# Patient Record
Sex: FEMALE | Race: WHITE | URBAN - METROPOLITAN AREA
[De-identification: names, ages, dates, MRNs, and addresses within clinical notes are randomized per-mention and may not be internally consistent; named-entity substitution may affect disease eponyms.]

---

## 2017-06-29 ENCOUNTER — OFFICE VISIT (OUTPATIENT)
Dept: FAMILY MEDICINE | Facility: CLINIC | Age: 22
End: 2017-06-29
Payer: COMMERCIAL

## 2017-06-29 VITALS
HEIGHT: 68 IN | TEMPERATURE: 98.6 F | OXYGEN SATURATION: 97 % | WEIGHT: 158.8 LBS | BODY MASS INDEX: 24.07 KG/M2 | SYSTOLIC BLOOD PRESSURE: 110 MMHG | DIASTOLIC BLOOD PRESSURE: 79 MMHG | HEART RATE: 98 BPM

## 2017-06-29 DIAGNOSIS — J02.9 SORE THROAT: ICD-10-CM

## 2017-06-29 DIAGNOSIS — J02.9 VIRAL PHARYNGITIS: Primary | ICD-10-CM

## 2017-06-29 LAB
DEPRECATED S PYO AG THROAT QL EIA: NORMAL
MICRO REPORT STATUS: NORMAL
SPECIMEN SOURCE: NORMAL

## 2017-06-29 PROCEDURE — 87880 STREP A ASSAY W/OPTIC: CPT | Performed by: FAMILY MEDICINE

## 2017-06-29 PROCEDURE — 99213 OFFICE O/P EST LOW 20 MIN: CPT | Performed by: FAMILY MEDICINE

## 2017-06-29 PROCEDURE — 87081 CULTURE SCREEN ONLY: CPT | Performed by: FAMILY MEDICINE

## 2017-06-29 NOTE — MR AVS SNAPSHOT
After Visit Summary   6/29/2017    Ace Peterson    MRN: [id number]           Patient Information     Date Of Birth          1995        Visit Information        Provider Department      6/29/2017 10:30 AM Duane White MD [facility]        Today's Diagnoses     Sore throat    -  1      Care Instructions      Mononucleosis  Mononucleosis (also called mono) is a contagious viral infection. Most infants and children exposed to the virus get only mild flu-like symptoms or no symptoms at all. However, infection is usually more serious in teens and young adults. While the virus is active it causes symptoms and can spread to others. After symptoms subside, the virus stays in the body and eventually becomes inactive. Once you have one case of mono, you are unlikely to develop symptoms again.  The virus is usually spread by contact with saliva, often by kissing. It may also spread by breast milk, blood, or sexual contact. It takes about 4 to 6 weeks to develop symptoms after exposure.  Early symptoms include headache, nausea, tiredness and general muscle aching. This is followed by sore throat and fever. Lymph glands in the neck, under the arms, or in the groin may be swollen. Symptoms usually go away in about 1 to 2 months. But they can last up to four months.  If symptoms have been present less than 1 week or more than 3 weeks, the blood test used to diagnose this disease may be negative even though you have the illness.  In this case, other tests may be done.  Note: Taking the antibiotics ampicillin or amoxicillin during a mono infection may cause a skin rash. This is not serious and will fade in about one week. The cause is a reaction of the drug with the virus.  Note: Mono can cause your spleen to swell. The spleen is a fist-sized organ in the upper left abdomen that stores red blood cells. Injury to a swollen spleen can cause the spleen to rupture. This can cause  life-threatening internal bleeding. To avoid this, do not play contact sports or perform strenuous activity for 8 weeks, or until cleared by your healthcare provider. A sharp blow could rupture a swollen spleen  Home care    Rest in bed until the fever and weakness have gone away.    Drink plenty of fluids, but avoid alcohol. Otherwise, you may eat a regular diet.    Ask your healthcare provider about using over-the-counter medicines to treat symptoms such as fever, pain, or an itchy rash.    Over-the-counter throat lozenges may help soothe a sore throat. Gargling with warm salt water (1/2 teaspoon in 1 glass of warm water) may also be soothing to the throat.    You may return to work or school after the fever goes away and you are feeling better. Continue to follow any activity restrictions you have been given.  Preventing spread of the virus  To limit the spread of the virus, avoid exposing others to your saliva for at least 6 months after your illness (no kissing or sharing utensils, drinking glasses, or toothbrushes).  Follow-up care  Follow up with your healthcare provider within 1 to 2 weeks or as advised by our staff to be sure that there are no complications. If symptoms of extreme fatigue and swollen glands last longer than 6 months, see your healthcare provider for further testing.  When to seek medical advice  Call your healthcare provider if any of the following occur:    Excessive coughing    Yellow skin or eyes     Trouble swallowing  Call 911  Get emergency medical care if any of the following occur:    Severe or worsening abdominal pain    Trouble breathing  Date Last Reviewed: 9/25/2015 2000-2017 [employer]. [address] [place] [address], [place] [address]. All rights reserved. This information is not intended as a substitute for professional medical care. Always follow your healthcare professional's instructions.                Follow-ups after your visit        Who to contact     If you  "have questions or need follow up information about today's clinic visit or your schedule please contact [facility] directly at [phone].  Normal or non-critical lab and imaging results will be communicated to you by MyChart, letter or phone within 4 business days after the clinic has received the results. If you do not hear from us within 7 days, please contact the clinic through Phys[employer]ome[employer] or phone. If you have a critical or abnormal lab result, we will notify you by phone as soon as possible.  Submit refill requests through [employer] or call your pharmacy and they will forward the refill request to us. Please allow 3 business days for your refill to be completed.          Additional Information About Your Visit        PhysihomeharDialMyApp Information     Strap lets you send messages to your doctor, view your test results, renew your prescriptions, schedule appointments and more. To sign up, go to [email or web address] . Click on \"Log in\" on the left side of the screen, which will take you to the Welcome page. Then click on \"Sign up Now\" on the right side of the page.     You will be asked to enter the access code listed below, as well as some personal information. Please follow the directions to create your username and password.     Your access code is: [email or web address]  Expires: 2017 10:56 AM     Your access code will  in 90 days. If you need help or a new code, please call your [place] clinic or [phone].        Care EveryWhere ID     This is your Care EveryWhere ID. This could be used by other organizations to access your [place] medical records  [id number]        Your Vitals Were     Pulse Temperature Height Last Period Pulse Oximetry Breastfeeding?    98 98.6  F (37  C) 5' 7.5\" (1.715 m) 2017 97% No    BMI (Body Mass Index)                   24.5 kg/m2            Blood Pressure from Last 3 Encounters:   17 110/79   16 110/60   05/21/15 118/62    Weight from Last 3 " Encounters:   06/29/17 158 lb 12.8 oz (72 kg)   09/12/16 162 lb 12.8 oz (73.8 kg)   05/21/15 150 lb 8 oz (68.3 kg) (82 %)*     * Growth percentiles are based on [facility] 2-20 Years data.              We Performed the Following     Beta strep group A culture     Strep, Rapid Screen        Primary Care Provider    None Specified       No primary provider on file.        Equal Access to Services     FLAKO REBOLLAR : Hadii dov ku madayo Soyaseminali, waaxda luqadaha, qaybta kaalmada adeegyada, luis carlos norris . So [facility] [phone].    ATENCIÓN: Si habla espmichelle, tiene a diaz disposición servicios gratuitos de asistencia lingüística. Llame al [phone].    We comply with applicable federal civil rights laws and [place] laws. We do not discriminate on the basis of race, color, national origin, age, disability sex, sexual orientation or gender identity.            Thank you!     Thank you for choosing [facility]  for your care. Our goal is always to provide you with excellent care. Hearing back from our patients is one way we can continue to improve our services. Please take a few minutes to complete the written survey that you may receive in the mail after your visit with us. Thank you!             Your Updated Medication List - Protect others around you: Learn how to safely use, store and throw away your medicines at www.disposemymeds.org.      Notice  As of 6/29/2017 10:56 AM    You have not been prescribed any medications.

## 2017-06-29 NOTE — NURSING NOTE
"Chief Complaint   Patient presents with     Pharyngitis       Initial /79 (BP Location: Right arm, Patient Position: Chair, Cuff Size: Adult Small)  Pulse 98  Temp 98.6  F (37  C)  Ht 5' 7.5\" (1.715 m)  Wt 158 lb 12.8 oz (72 kg)  LMP 06/17/2017  SpO2 97%  Breastfeeding? No  BMI 24.5 kg/m2 Estimated body mass index is 24.5 kg/(m^2) as calculated from the following:    Height as of this encounter: 5' 7.5\" (1.715 m).    Weight as of this encounter: 158 lb 12.8 oz (72 kg).  Medication Reconciliation: complete     Ka    "

## 2017-06-30 LAB
BACTERIA SPEC CULT: NORMAL
MICRO REPORT STATUS: NORMAL
SPECIMEN SOURCE: NORMAL

## 2017-07-09 NOTE — PROGRESS NOTES
"  SUBJECTIVE:                                                    Ace Peterson is a 22 year old female who presents to clinic today for the following health issues:    Patient presents with:  Pharyngitis    Patient here for sore throat for the past few days.  Has had a fever intermittently during this time. Denies any contact with strep or mono. Denies cough. No ear pain.    ROS:  CONSTITUTIONAL: Intermittent fever  ENT/MOUTH:  As noted above    OBJECTIVE:                                                    /79 (BP Location: Right arm, Patient Position: Chair, Cuff Size: Adult Small)  Pulse 98  Temp 98.6  F (37  C)  Ht 5' 7.5\" (1.715 m)  Wt 158 lb 12.8 oz (72 kg)  LMP 06/17/2017  SpO2 97%  Breastfeeding? No  BMI 24.5 kg/m2Body mass index is 24.5 kg/(m^2).  GENERAL APPEARANCE: healthy, alert and no distress  EYES: Eyes grossly normal to inspection and conjunctivae and sclerae normal  HENT: ear canals and TM's normal and bilateral tonsils  2+ enlarged with mild erythema, no exudate  NECK: no adenopathy  RESP: lungs clear to auscultation - no rales, rhonchi or wheezes  ABD: no hepatosplenomegaly    Results for orders placed or performed in visit on 06/29/17   Strep, Rapid Screen   Result Value Ref Range    Specimen Description Throat     Rapid Strep A Screen       NEGATIVE: No Group A streptococcal antigen detected by immunoassay, await   culture report.      Micro Report Status FINAL 06/29/2017    Beta strep group A culture   Result Value Ref Range    Specimen Description Throat     Culture Micro No beta hemolytic Streptococcus Group A isolated     Micro Report Status FINAL 06/30/2017         ASSESSMENT/PLAN:                                                    1. Viral pharyngitis   Discussed likely viral pharyngitis. Consider follow-up testing for mononucleosis if not improving over the next few days as it is early in course today and high false negative rate and will return to clinic if this is the " case. Discuss conservative management.    2. Sore throat  - Strep, Rapid Screen  - Beta strep group A culture    Duane White MD  [facility]

## 2017-09-05 ENCOUNTER — ALLIED HEALTH/NURSE VISIT (OUTPATIENT)
Dept: NURSING | Facility: CLINIC | Age: 22
End: 2017-09-05
Payer: COMMERCIAL

## 2017-09-05 DIAGNOSIS — Z23 NEED FOR PROPHYLACTIC VACCINATION AND INOCULATION AGAINST INFLUENZA: Primary | ICD-10-CM

## 2017-09-05 DIAGNOSIS — Z11.1 SCREENING EXAMINATION FOR PULMONARY TUBERCULOSIS: ICD-10-CM

## 2017-09-05 PROCEDURE — 99207 ZZC NO CHARGE NURSE ONLY: CPT

## 2017-09-05 PROCEDURE — 90471 IMMUNIZATION ADMIN: CPT

## 2017-09-05 PROCEDURE — 90686 IIV4 VACC NO PRSV 0.5 ML IM: CPT

## 2017-09-05 PROCEDURE — 86580 TB INTRADERMAL TEST: CPT

## 2017-09-05 NOTE — PROGRESS NOTES
Injectable Influenza Immunization Documentation    1.  Is the person to be vaccinated sick today?  No    2. Does the person to be vaccinated have an allergy to eggs or to a component of the vaccine?  No    3. Has the person to be vaccinated today ever had a serious reaction to influenza vaccine in the past?  No    4. Has the person to be vaccinated ever had Guillain-[place] syndrome?  No     Form completed by Jarod Rodriguez MA

## 2017-09-05 NOTE — NURSING NOTE
The patient is asked the following questions today and these are her answers:    -Have you had a mantoux administered in the past 30 days?    No  -Have you had a previous positive Mantoux.  No  -Have you received BCG in the past.  No  -Have you had a live vaccine  (MMR, Varicella, OPV, Yellow Fever) in the last 6 weeks.  No  -Have you had and active  viral or bacterial infection in the past 6 weeks.  No  -Have you received corticosteroids or immunosuppressive agents in the past 6 weeks.  No  -Have you been diagnosed with HIV?  No  -Do you have a maglinancy?  No   Jarod Rodriguez MA

## 2017-09-05 NOTE — MR AVS SNAPSHOT
"              After Visit Summary   9/5/2017    Ace Peterson    MRN: [id number]           Patient Information     Date Of Birth          1995        Visit Information        Provider Department      9/5/2017 2:00 PM CR SILVER MA/LPN [facility]        Today's Diagnoses     Need for prophylactic vaccination and inoculation against influenza    -  1    Screening examination for pulmonary tuberculosis           Follow-ups after your visit        Your next 10 appointments already scheduled     Sep 07, 2017  3:00 PM CDT   Nurse Only with CR RN   [facility] ([facility])    [address] [facility] [address] [phone]              Who to contact     If you have questions or need follow up information about today's clinic visit or your schedule please contact [facility] directly at [phone].  Normal or non-critical lab and imaging results will be communicated to you by [employer] Everytimehart, letter or phone within 4 business days after the clinic has received the results. If you do not hear from us within 7 days, please contact the clinic through MyChart or phone. If you have a critical or abnormal lab result, we will notify you by phone as soon as possible.  Submit refill requests through [employer] or call your pharmacy and they will forward the refill request to us. Please allow 3 business days for your refill to be completed.          Additional Information About Your Visit        MyChart Information     [employer] lets you send messages to your doctor, view your test results, renew your prescriptions, schedule appointments and more. To sign up, go to [email or web address] . Click on \"Log in\" on the left side of the screen, which will take you to the Welcome page. Then click on \"Sign up Now\" on the right side of the page.     You will be asked to enter the access code listed below, as well as some personal information. Please " follow the directions to create your username and password.     Your access code is: [email or web address]  Expires: 2017 10:56 AM     Your access code will  in 90 days. If you need help or a new code, please call your [place] clinic or [phone].        Care EveryWhere ID     This is your Care EveryWhere ID. This could be used by other organizations to access your [place] medical records  [id number]         Blood Pressure from Last 3 Encounters:   17 110/79   16 110/60   05/21/15 118/62    Weight from Last 3 Encounters:   17 158 lb 12.8 oz (72 kg)   16 162 lb 12.8 oz (73.8 kg)   05/21/15 150 lb 8 oz (68.3 kg) (82 %)*     * Growth percentiles are based on [facility] 2-20 Years data.              We Performed the Following     FLU VAC, SPLIT VIRUS IM > 3 YO (QUADRIVALENT) [54840]     TB INTRADERMAL TEST     Vaccine Administration, Initial [07420]        Primary Care Provider    None Specified       No primary provider on file.        Equal Access to Services     VERN REBOLLAR : Hadii dov Oleary, waniyahda sabina, qaybta kaalmabri estevez, luis carlos norris . So [facility] [phone].    ATENCIÓN: Si habla español, tiene a diaz disposición servicios gratuitos de asistencia lingüística. Llame al [phone].    We comply with applicable federal civil rights laws and [place] laws. We do not discriminate on the basis of race, color, national origin, age, disability sex, sexual orientation or gender identity.            Thank you!     Thank you for choosing [facility]  for your care. Our goal is always to provide you with excellent care. Hearing back from our patients is one way we can continue to improve our services. Please take a few minutes to complete the written survey that you may receive in the mail after your visit with us. Thank you!             Your Updated Medication List - Protect others around you: Learn how to safely use, store and  throw away your medicines at www.disposemymeds.org.      Notice  As of 9/5/2017  2:21 PM    You have not been prescribed any medications.

## 2017-09-07 ENCOUNTER — ALLIED HEALTH/NURSE VISIT (OUTPATIENT)
Dept: NURSING | Facility: CLINIC | Age: 22
End: 2017-09-07
Payer: COMMERCIAL

## 2017-09-07 DIAGNOSIS — Z11.1 SCREENING EXAMINATION FOR PULMONARY TUBERCULOSIS: Primary | ICD-10-CM

## 2017-09-07 LAB
PPDINDURATION: 0 MM (ref 0–5)
PPDREDNESS: NORMAL MM

## 2017-09-07 PROCEDURE — 99207 ZZC NO CHARGE NURSE ONLY: CPT

## 2017-09-07 NOTE — MR AVS SNAPSHOT
"              After Visit Summary   9/7/2017    Ace Peterson    MRN: [id number]           Patient Information     Date Of Birth          1995        Visit Information        Provider Department      9/7/2017 3:00 PM CR RN [facility]        Today's Diagnoses     Screening examination for pulmonary tuberculosis    -  1       Follow-ups after your visit        Your next 10 appointments already scheduled     Sep 07, 2017  3:00 PM CDT   [facility]    [address] [place] [id number] [phone]              Who to contact     If you have questions or need follow up information about today's clinic visit or your schedule please contact [facility] directly at [phone].  Normal or non-critical lab and imaging results will be communicated to you by MyChart, letter or phone within 4 business days after the clinic has received the results. If you do not hear from us within 7 days, please contact the clinic through MyChart or phone. If you have a critical or abnormal lab result, we will notify you by phone as soon as possible.  Submit refill requests through [employer] or call your pharmacy and they will forward the refill request to us. Please allow 3 business days for your refill to be completed.          Additional Information About Your Visit        MyChart Information     [employer] lets you send messages to your doctor, view your test results, renew your prescriptions, schedule appointments and more. To sign up, go to [email or web address] . Click on \"Log in\" on the left side of the screen, which will take you to the Welcome page. Then click on \"Sign up Now\" on the right side of the page.     You will be asked to enter the access code listed below, as well as some personal information. Please follow the directions to create your username and password.     Your access code is: " [email or web address]  Expires: 2017 10:56 AM     Your access code will  in 90 days. If you need help or a new code, please call your [place] clinic or [phone].        Care EveryWhere ID     This is your Care EveryWhere ID. This could be used by other organizations to access your [place] medical records  [id number]         Blood Pressure from Last 3 Encounters:   17 110/79   16 110/60   05/21/15 118/62    Weight from Last 3 Encounters:   17 158 lb 12.8 oz (72 kg)   16 162 lb 12.8 oz (73.8 kg)   05/21/15 150 lb 8 oz (68.3 kg) (82 %)*     * Growth percentiles are based on [facility] 2-20 Years data.              Today, you had the following     No orders found for display       Primary Care Provider    None Specified       No primary provider on file.        Equal Access to Services     FLAKO REBOLLAR : Hadii dvo Oleary, pedro muhammad, norris kaalmabri estevez, luis carlos norris . So [facility] [phone].    ATENCIÓN: Si habla español, tiene a diaz disposición servicios gratuitos de asistencia lingüística. Llame al [phone].    We comply with applicable federal civil rights laws and [place] laws. We do not discriminate on the basis of race, color, national origin, age, disability sex, sexual orientation or gender identity.            Thank you!     Thank you for choosing [facility]  for your care. Our goal is always to provide you with excellent care. Hearing back from our patients is one way we can continue to improve our services. Please take a few minutes to complete the written survey that you may receive in the mail after your visit with us. Thank you!             Your Updated Medication List - Protect others around you: Learn how to safely use, store and throw away your medicines at www.disposemymeds.org.      Notice  As of 2017  2:43 PM    You have not been prescribed any medications.

## 2019-01-04 ENCOUNTER — ALLIED HEALTH/NURSE VISIT (OUTPATIENT)
Dept: NURSING | Facility: CLINIC | Age: 24
End: 2019-01-04
Payer: COMMERCIAL

## 2019-01-04 DIAGNOSIS — Z23 NEED FOR VACCINATION: Primary | ICD-10-CM

## 2019-01-04 PROCEDURE — 90715 TDAP VACCINE 7 YRS/> IM: CPT

## 2019-01-04 PROCEDURE — 90471 IMMUNIZATION ADMIN: CPT

## 2019-01-04 PROCEDURE — 99207 ZZC NO CHARGE NURSE ONLY: CPT
